# Patient Record
Sex: FEMALE | Race: NATIVE HAWAIIAN OR OTHER PACIFIC ISLANDER | URBAN - METROPOLITAN AREA
[De-identification: names, ages, dates, MRNs, and addresses within clinical notes are randomized per-mention and may not be internally consistent; named-entity substitution may affect disease eponyms.]

---

## 2017-03-10 ENCOUNTER — OFFICE VISIT (OUTPATIENT)
Dept: FAMILY MEDICINE CLINIC | Age: 37
End: 2017-03-10

## 2017-03-10 VITALS
HEIGHT: 62 IN | DIASTOLIC BLOOD PRESSURE: 88 MMHG | TEMPERATURE: 100.8 F | HEART RATE: 96 BPM | RESPIRATION RATE: 20 BRPM | WEIGHT: 133 LBS | BODY MASS INDEX: 24.48 KG/M2 | SYSTOLIC BLOOD PRESSURE: 130 MMHG | OXYGEN SATURATION: 97 %

## 2017-03-10 DIAGNOSIS — R52 BODY ACHES: ICD-10-CM

## 2017-03-10 DIAGNOSIS — L03.114 CELLULITIS OF LEFT ELBOW: ICD-10-CM

## 2017-03-10 DIAGNOSIS — S40.022A ARM CONTUSION, LEFT, INITIAL ENCOUNTER: ICD-10-CM

## 2017-03-10 DIAGNOSIS — Z86.79 H/O: HYPERTENSION: ICD-10-CM

## 2017-03-10 DIAGNOSIS — Z86.39 H/O HYPERLIPIDEMIA: ICD-10-CM

## 2017-03-10 DIAGNOSIS — R05.9 COUGH: ICD-10-CM

## 2017-03-10 DIAGNOSIS — B34.9 NONSPECIFIC SYNDROME SUGGESTIVE OF VIRAL ILLNESS: Primary | ICD-10-CM

## 2017-03-10 DIAGNOSIS — J02.9 SORE THROAT: ICD-10-CM

## 2017-03-10 DIAGNOSIS — Z13.1 SCREENING FOR DIABETES MELLITUS: ICD-10-CM

## 2017-03-10 LAB
QUICKVUE INFLUENZA TEST: NEGATIVE
S PYO AG THROAT QL: NEGATIVE
VALID INTERNAL CONTROL?: YES
VALID INTERNAL CONTROL?: YES

## 2017-03-10 RX ORDER — OSELTAMIVIR PHOSPHATE 75 MG/1
75 CAPSULE ORAL DAILY
Qty: 7 CAP | Refills: 0 | Status: SHIPPED | OUTPATIENT
Start: 2017-03-10 | End: 2017-03-17

## 2017-03-10 RX ORDER — SULFAMETHOXAZOLE AND TRIMETHOPRIM 800; 160 MG/1; MG/1
1 TABLET ORAL 2 TIMES DAILY
Qty: 20 TAB | Refills: 0 | Status: SHIPPED | OUTPATIENT
Start: 2017-03-10 | End: 2017-03-20

## 2017-03-10 NOTE — LETTER
NOTIFICATION OF RETURN TO WORK 
 
3/10/2017 2:16 PM 
 
Ms. Stalin Holloway [address] [place] [id number] Arsen Felix To Whom It May Concern: 
 
Stalin Holloway was under the care of [address] [place] [address]. She will be able to return to work on Wednesday, March 15, 2017  with no restrictions. If there are questions or concerns please have the patient contact our office.  
 
Sincerely, 
 
 
Jenny Zaldivar NP

## 2017-03-10 NOTE — MR AVS SNAPSHOT
Visit Information Date & Time Provider Department Dept. Phone Encounter #  
 3/10/2017  2:00 PM Alethea Mcgrath, Sherry[facility] [phone] [id number] Follow-up Instructions Return in about 1 month (around 4/10/2017), or if symptoms worsen or fail to improve, for f/u in 1 month for physical exam and pap smear. Marisol Holguin Upcoming Health Maintenance Date Due  
 PAP AKA CERVICAL CYTOLOGY 2/6/1999 INFLUENZA AGE 9 TO ADULT 8/1/2016 DTaP/Tdap/Td series (2 - Td) 7/13/2025 Allergies as of 3/10/2017  Review Complete On: 3/10/2017 By: Alethea Mcgrath NP No Known Allergies Current Immunizations  Never Reviewed No immunizations on file. Not reviewed this visit You Were Diagnosed With   
  
 Codes Comments Nonspecific syndrome suggestive of viral illness    -  Primary ICD-10-CM: B34.9 ICD-9-CM: 079.99 Sore throat     ICD-10-CM: J02.9 ICD-9-CM: 713 Cough     ICD-10-CM: R05 ICD-9-CM: 786.2 Body aches     ICD-10-CM: R52 ICD-9-CM: 780.96   
 H/O: hypertension     ICD-10-CM: Z86.79 
ICD-9-CM: V12.59   
 H/O hyperlipidemia     ICD-10-CM: Z86.39 
ICD-9-CM: V12.29 Screening for diabetes mellitus     ICD-10-CM: Z13.1 ICD-9-CM: V77.1 Arm contusion, right, initial encounter     ICD-10-CM: H70.851V ICD-9-CM: 923.9 Cellulitis of left elbow     ICD-10-CM: L03.114 
ICD-9-CM: 682. 3 Vitals BP Pulse Temp Resp Height(growth percentile) Weight(growth percentile) 130/88 96 (!) 100.8 °F (38.2 °C) (Oral) 20 5' 2\" (1.575 m) 133 lb (60.3 kg) LMP SpO2 BMI OB Status Smoking Status 02/20/2017 97% 24.33 kg/m2 Having regular periods Never Smoker BMI and BSA Data Body Mass Index Body Surface Area  
 24.33 kg/m 2 1.62 m 2 Preferred Pharmacy Pharmacy Name Phone [place], [address] [phone] Your Updated Medication List  
  
   
 This list is accurate as of: 3/10/17  2:22 PM.  Always use your most recent med list.  
  
  
  
  
 oseltamivir 75 mg capsule Commonly known as:  TAMIFLU Take 1 Cap by mouth daily for 7 days. trimethoprim-sulfamethoxazole 160-800 mg per tablet Commonly known as:  BACTRIM DS, SEPTRA DS Take 1 Tab by mouth two (2) times a day for 10 days. Prescriptions Sent to Pharmacy Refills  
 trimethoprim-sulfamethoxazole (BACTRIM DS, SEPTRA DS) 160-800 mg per tablet 0 Sig: Take 1 Tab by mouth two (2) times a day for 10 days. Class: Normal  
 Pharmacy: [facility] [address] [place] [address], [address] Ph #: [phone] Route: Oral  
 oseltamivir (TAMIFLU) 75 mg capsule 0 Sig: Take 1 Cap by mouth daily for 7 days. Class: Normal  
 Pharmacy: [facility] [address] [place] [address], [address] [place] [address] Ph #: [phone] Route: Oral  
  
We Performed the Following AMB POC RAPID INFLUENZA TEST [49269 CPT(R)] AMB POC RAPID STREP A [42004 CPT(R)] Follow-up Instructions Return in about 1 month (around 4/10/2017), or if symptoms worsen or fail to improve, for f/u in 1 month for physical exam and pap smear. Jignesh Pelayo To-Do List   
 03/10/2017 Lab:  CBC WITH AUTOMATED DIFF   
  
 03/10/2017 Lab:  HEMOGLOBIN A1C WITH EAG   
  
 03/10/2017 Lab:  LIPID PANEL   
  
 03/10/2017 Lab:  METABOLIC PANEL, COMPREHENSIVE Introducing [facility]! [facility] introduces [employer] patient portal. Now you can access parts of your medical record, email your doctor's office, and request medication refills online. 1. In your internet browser, go to [email or web address] 2. Click on the First Time User? Click Here link in the Sign In box. You will see the New Member Sign Up page. 3. Enter your [employer] Access Code exactly as it appears below.  You will not need to use this code after youve completed the sign-up process. If you do not sign up before the expiration date, you must request a new code. · [employer] Access Code: [email or web address] Expires: 6/8/2017  1:56 PM 
 
4. Enter the last four digits of your Social Security Number (xxxx) and Date of Birth (mm/dd/yyyy) as indicated and click Submit. You will be taken to the next sign-up page. 5. Create a [employer] ID. This will be your [employer] login ID and cannot be changed, so think of one that is secure and easy to remember. 6. Create a [employer] password. You can change your password at any time. 7. Enter your Password Reset Question and Answer. This can be used at a later time if you forget your password. 8. Enter your e-mail address. You will receive e-mail notification when new information is available in [address] 9. Click Sign Up. You can now view and download portions of your medical record. 10. Click the Download Summary menu link to download a portable copy of your medical information. If you have questions, please visit the Frequently Asked Questions section of the [employer] website. Remember, [employer] is NOT to be used for urgent needs. For medical emergencies, dial 911. Now available from your iPhone and Android! Please provide this summary of care documentation to your next provider. Your primary care clinician is listed as Atif Reyes. If you have any questions after today's visit, please call [phone].

## 2017-03-10 NOTE — PROGRESS NOTES
1. Have you been to the ER, urgent care clinic since your last visit? Hospitalized since your last visit? No    2. Have you seen or consulted any other health care providers outside of the [facility] since your last visit? Include any pap smears or colon screening.  No

## 2017-03-10 NOTE — PROGRESS NOTES
Ashwini Chatman is a 42 y.o.  female and presents with     Chief Complaint   Patient presents with    Cold Symptoms    Arm Pain    Generalized Body Aches    Cough         Subjective:  Ms. Godfrey Mcbride presents today to establish care. She states she is feeling achy, sore throat at night, nasal stuffiness, cough. She took some Mucinex with some relief. Her 2 nephews stayed with her over the weekend and they were diagnosed with influenza on Tuesday. She was also hit by a football in her left arm/elbow 2 weeks ago and it remains swollen and indurated. Additional Concerns: NONE        There is no problem list on file for this patient. There are no active problems to display for this patient. Current Outpatient Prescriptions   Medication Sig Dispense Refill    trimethoprim-sulfamethoxazole (BACTRIM DS, SEPTRA DS) 160-800 mg per tablet Take 1 Tab by mouth two (2) times a day for 10 days. 20 Tab 0    oseltamivir (TAMIFLU) 75 mg capsule Take 1 Cap by mouth daily for 7 days. 7 Cap 0     No Known Allergies  Past Medical History:   Diagnosis Date    Diabetes in pregnancy     2007    Hypercholesterolemia     Hypertension      History reviewed. No pertinent surgical history.   Family History   Problem Relation Age of Onset   Eamon Miranda Stroke Mother     Hypertension Mother     Elevated Lipids Mother     Hypertension Father    Eamon Miranda Elevated Lipids Father     No Known Problems Sister     Hypertension Maternal Grandmother     Stroke Maternal Grandmother     Elevated Lipids Maternal Grandmother     Elevated Lipids Maternal Grandfather     Hypertension Maternal Grandfather     Stroke Maternal Grandfather     Elevated Lipids Paternal Grandmother     Hypertension Paternal Grandmother     Stroke Paternal Grandmother     Elevated Lipids Paternal Grandfather     Hypertension Paternal Grandfather     Stroke Paternal Grandfather      Social History   Substance Use Topics    Smoking status: Never Smoker    Smokeless tobacco: Never Used    Alcohol use 1.2 oz/week     2 Glasses of wine per week      Comment: once monthly       ROS     History obtained from the patient  General ROS: positive for  - chills, fatigue, fever and night sweats  negative for - sleep disturbance  Psychological ROS: negative  Ophthalmic ROS: negative for - blurry vision, eye pain or photophobia  ENT ROS: positive for - headaches, sore throat and nasal congestion  negative for - sneezing  Respiratory ROS: no cough, shortness of breath, or wheezing  Cardiovascular ROS: no chest pain or dyspnea on exertion  Gastrointestinal ROS: no abdominal pain, change in bowel habits, or black or bloody stools  Genito-Urinary ROS: no dysuria, trouble voiding, or hematuria  Musculoskeletal ROS: positive for - joint pain, muscle pain and pain in elbow - left  Neurological ROS: no TIA or stroke symptoms      Objective:  Vitals:    03/10/17 1337   BP: 130/88   Pulse: 96   Resp: 20   Temp: (!) 100.8 °F (38.2 °C)   TempSrc: Oral   SpO2: 97%   Weight: 133 lb (60.3 kg)   Height: 5' 2\" (1.575 m)   PainSc:   4   PainLoc: Generalized   LMP: 02/20/2017       PHYSICAL EXAM    Alert, well appearing, and in no distress, oriented to person, place, and time and normal appearing weight  Mental status - alert, oriented to person, place, and time, normal mood, behavior, speech, dress, motor activity, and thought processes, affect appropriate to mood  Eyes - pupils equal and reactive, extraocular eye movements intact  Ears - bilateral TM's and external ear canals normal, hearing grossly normal bilaterally  Nose - mucosal congestion and sinuses normal and nontender  Mouth - mucous membranes moist, pharynx normal without lesions, tonsils normal and tongue normal  Neck - bilateral symmetric anterior adenopathy  Lymphatics - moderate tender anterior cervical nodes  Chest - clear to auscultation, no wheezes, rales or rhonchi, symmetric air entry  Heart - normal rate, regular rhythm, normal S1, S2, no murmurs, rubs, clicks or gallops, no JVD  Abdomen - bowel sounds normal  Neurological - alert, oriented, normal speech, no focal findings or movement disorder noted, cranial nerves II through XII intact, normal muscle tone, no tremors, strength 5/5  Musculoskeletal - muscular tenderness and ecchymosis noted to left elbow, abnormal exam of left elbow with cellulitis. Extremities - peripheral pulses normal, no pedal edema, no clubbing or cyanosis      LABS   CBC, CMP, lipid, A1C ordered  POC rapid strep (-)  POC influenza (-)      Assessment/Plan:    Viral syndrome/Sore throat/Cough - Will treat with Tamiflu take as directed. Tylenol/Ibuprofen OTC for pain. Salt water gargles. Chloraseptic spray OTC for pain. Get plenty of rest. Increase oral hydration. Continue Mucinex as directed. Arm contusion, left/Cellulitis of left - Prescribed Bactrim continue to take as directed. H/O hypertension - Stable today. Will continue to monitor. Labs today. H/O hyperlipidemia - Labs today. Lab review: orders written for new lab studies as appropriate; see orders      I have discussed the diagnosis with the patient and the intended plan as seen in the above orders. The patient has received an after-visit summary and questions were answered concerning future plans. I have discussed medication side effects and warnings with the patient as well. I have reviewed the plan of care with the patient, accepted their input and they are in agreement with the treatment goals. Follow-up Disposition:  Return in about 1 month (around 4/10/2017), or if symptoms worsen or fail to improve, for f/u in 1 month for physical exam and pap smear. .      More than 1/2 of this 30 minute visit was spent in counseling and coordination of care, as described above.       Jenn Carlson, RN, MSN, FNP-C

## 2017-03-10 NOTE — PATIENT INSTRUCTIONS
Cellulitis: Care Instructions  Your Care Instructions    Cellulitis is a skin infection. It often occurs after a break in the skin from a scrape, cut, bite, or puncture, or after a rash. The doctor has checked you carefully, but problems can develop later. If you notice any problems or new symptoms, get medical treatment right away. Follow-up care is a key part of your treatment and safety. Be sure to make and go to all appointments, and call your doctor if you are having problems. It's also a good idea to know your test results and keep a list of the medicines you take. How can you care for yourself at home? · Take your antibiotics as directed. Do not stop taking them just because you feel better. You need to take the full course of antibiotics. · Prop up the infected area on pillows to reduce pain and swelling. Try to keep the area above the level of your heart as often as you can. · If your doctor told you how to care for your wound, follow your doctor's instructions. If you did not get instructions, follow this general advice:  ¨ Wash the wound with clean water 2 times a day. Don't use hydrogen peroxide or alcohol, which can slow healing. ¨ You may cover the wound with a thin layer of petroleum jelly, such as Vaseline, and a nonstick bandage. ¨ Apply more petroleum jelly and replace the bandage as needed. · Be safe with medicines. Take pain medicines exactly as directed. ¨ If the doctor gave you a prescription medicine for pain, take it as prescribed. ¨ If you are not taking a prescription pain medicine, ask your doctor if you can take an over-the-counter medicine. To prevent cellulitis in the future  · Try to prevent cuts, scrapes, or other injuries to your skin. Cellulitis most often occurs where there is a break in the skin. · If you get a scrape, cut, mild burn, or bite, wash the wound with clean water as soon as you can to help avoid infection.  Don't use hydrogen peroxide or alcohol, which can slow healing. · If you have swelling in your legs (edema), support stockings and good skin care may help prevent leg sores and cellulitis. · Take care of your feet, especially if you have diabetes or other conditions that increase the risk of infection. Wear shoes and socks. Do not go barefoot. If you have athlete's foot or other skin problems on your feet, talk to your doctor about how to treat them. When should you call for help? Call your doctor now or seek immediate medical care if:  · You have signs that your infection is getting worse, such as:  ¨ Increased pain, swelling, warmth, or redness. ¨ Red streaks leading from the area. ¨ Pus draining from the area. ¨ A fever. · You get a rash. Watch closely for changes in your health, and be sure to contact your doctor if:  · You are not getting better after 1 day (24 hours). · You do not get better as expected. Where can you learn more? Go to http://cherie-juan.info/. Viral Lyn in the search box to learn more about \"Cellulitis: Care Instructions. \"  Current as of: February 5, 2016  Content Version: 11.1  © 0672-1090 [employer]. Care instructions adapted under license by [employer] (which disclaims liability or warranty for this information). If you have questions about a medical condition or this instruction, always ask your healthcare professional. [place] [address] any warranty or liability for your use of this information. Viral Illness in Children: Care Instructions  Your Care Instructions  Viruses cause many illnesses in children, from colds and stomach flu to mumps. Sometimes children have general symptoms--such as not feeling like eating or just not feeling well--that do not fit with a specific illness. If your child has a rash, your doctor may be able to tell clearly if your child has an illness such as measles.  Sometimes a child may have what is called a nonspecific viral illness that is not as easy to name. A number of viruses can cause this mild illness. Antibiotics do not work for a viral illness. Your child will probably feel better in a few days. If not, call your child's doctor. Follow-up care is a key part of your child's treatment and safety. Be sure to make and go to all appointments, and call your doctor if your child is having problems. It's also a good idea to know your child's test results and keep a list of the medicines your child takes. How can you care for your child at home? · Have your child rest.  · Give your child acetaminophen (Tylenol) or ibuprofen (Advil, Motrin) for fever, pain, or fussiness. Read and follow all instructions on the label. Do not give aspirin to anyone younger than 20. It has been linked to Reye syndrome, a serious illness. · Be careful when giving your child over-the-counter cold or flu medicines and Tylenol at the same time. Many of these medicines contain acetaminophen, which is Tylenol. Read the labels to make sure that you are not giving your child more than the recommended dose. Too much Tylenol can be harmful. · Be careful with cough and cold medicines. Don't give them to children younger than 6, because they don't work for children that age and can even be harmful. For children 6 and older, always follow all the instructions carefully. Make sure you know how much medicine to give and how long to use it. And use the dosing device if one is included. · Give your child lots of fluids, enough so that the urine is light yellow or clear like water. This is very important if your child is vomiting or has diarrhea. Give your child sips of water or drinks such as Pedialyte or Infalyte. These drinks contain a mix of salt, sugar, and minerals. You can buy them at drugstores or grocery stores. Give these drinks as long as your child is throwing up or has diarrhea.  Do not use them as the only source of liquids or food for more than 12 to 24 hours.  · Keep your child home from school, day care, or other public places while he or she has a fever. · Use cold, wet cloths on a rash to reduce itching. When should you call for help? Call your doctor now or seek immediate medical care if:  · Your child has signs of needing more fluids. These signs include sunken eyes with few tears, dry mouth with little or no spit, and little or no urine for 6 hours. Watch closely for changes in your child's health, and be sure to contact your doctor if:  · Your child has a new or higher fever. · Your child is not feeling better within 2 days. · Your child's symptoms are getting worse. Where can you learn more? Go to http://cherie-juan.info/. Enter [id number] in the search box to learn more about \"Viral Illness in Children: Care Instructions. \"  Current as of: May 24, 2016  Content Version: 11.1  © 7431-7904 [employer]. Care instructions adapted under license by [employer] (which disclaims liability or warranty for this information). If you have questions about a medical condition or this instruction, always ask your healthcare professional. [place] [address] any warranty or liability for your use of this information.

## 2017-04-20 ENCOUNTER — DOCUMENTATION ONLY (OUTPATIENT)
Dept: FAMILY MEDICINE CLINIC | Age: 37
End: 2017-04-20